# Patient Record
Sex: FEMALE | Race: WHITE | NOT HISPANIC OR LATINO | Employment: FULL TIME | URBAN - METROPOLITAN AREA
[De-identification: names, ages, dates, MRNs, and addresses within clinical notes are randomized per-mention and may not be internally consistent; named-entity substitution may affect disease eponyms.]

---

## 2022-01-27 ENCOUNTER — APPOINTMENT (OUTPATIENT)
Dept: GENERAL RADIOLOGY | Facility: CLINIC | Age: 37
DRG: 871 | End: 2022-01-27
Attending: EMERGENCY MEDICINE
Payer: COMMERCIAL

## 2022-01-27 ENCOUNTER — HOSPITAL ENCOUNTER (INPATIENT)
Facility: CLINIC | Age: 37
LOS: 2 days | Discharge: HOME | DRG: 871 | End: 2022-01-29
Attending: EMERGENCY MEDICINE | Admitting: OBSTETRICS & GYNECOLOGY
Payer: COMMERCIAL

## 2022-01-27 DIAGNOSIS — D72.829 LEUKOCYTOSIS, UNSPECIFIED TYPE: ICD-10-CM

## 2022-01-27 DIAGNOSIS — N76.2 VULVAR CELLULITIS: ICD-10-CM

## 2022-01-27 DIAGNOSIS — U07.1 INFECTION DUE TO 2019 NOVEL CORONAVIRUS: ICD-10-CM

## 2022-01-27 LAB
ALBUMIN SERPL-MCNC: 3.4 G/DL (ref 3.4–5)
ALBUMIN UR-MCNC: 50 MG/DL
ALP SERPL-CCNC: 89 U/L (ref 40–150)
ALT SERPL W P-5'-P-CCNC: 21 U/L (ref 0–50)
ANION GAP SERPL CALCULATED.3IONS-SCNC: 12 MMOL/L (ref 3–14)
APPEARANCE UR: CLEAR
AST SERPL W P-5'-P-CCNC: 12 U/L (ref 0–45)
BACTERIA #/AREA URNS HPF: ABNORMAL /HPF
BASOPHILS # BLD AUTO: 0.1 10E3/UL (ref 0–0.2)
BASOPHILS NFR BLD AUTO: 0 %
BILIRUB DIRECT SERPL-MCNC: 0.3 MG/DL (ref 0–0.2)
BILIRUB SERPL-MCNC: 1.2 MG/DL (ref 0.2–1.3)
BILIRUB UR QL STRIP: NEGATIVE
BUN SERPL-MCNC: 10 MG/DL (ref 7–30)
CALCIUM SERPL-MCNC: 9 MG/DL (ref 8.5–10.1)
CHLORIDE BLD-SCNC: 102 MMOL/L (ref 94–109)
CO2 SERPL-SCNC: 23 MMOL/L (ref 20–32)
COLOR UR AUTO: YELLOW
CREAT SERPL-MCNC: 0.71 MG/DL (ref 0.52–1.04)
CRP SERPL-MCNC: 108 MG/L (ref 0–8)
D DIMER PPP FEU-MCNC: 0.89 UG/ML FEU (ref 0–0.5)
EOSINOPHIL # BLD AUTO: 0 10E3/UL (ref 0–0.7)
EOSINOPHIL NFR BLD AUTO: 0 %
ERYTHROCYTE [DISTWIDTH] IN BLOOD BY AUTOMATED COUNT: 12.3 % (ref 10–15)
GFR SERPL CREATININE-BSD FRML MDRD: >90 ML/MIN/1.73M2
GLUCOSE BLD-MCNC: 106 MG/DL (ref 70–99)
GLUCOSE UR STRIP-MCNC: NEGATIVE MG/DL
HCG UR QL: NEGATIVE
HCT VFR BLD AUTO: 39.1 % (ref 35–47)
HGB BLD-MCNC: 13.1 G/DL (ref 11.7–15.7)
HGB UR QL STRIP: ABNORMAL
HOLD SPECIMEN: NORMAL
IMM GRANULOCYTES # BLD: 0.3 10E3/UL
IMM GRANULOCYTES NFR BLD: 1 %
KETONES UR STRIP-MCNC: >150 MG/DL
LACTATE SERPL-SCNC: 1 MMOL/L (ref 0.7–2)
LEUKOCYTE ESTERASE UR QL STRIP: NEGATIVE
LYMPHOCYTES # BLD AUTO: 2.5 10E3/UL (ref 0.8–5.3)
LYMPHOCYTES NFR BLD AUTO: 8 %
MCH RBC QN AUTO: 29.8 PG (ref 26.5–33)
MCHC RBC AUTO-ENTMCNC: 33.5 G/DL (ref 31.5–36.5)
MCV RBC AUTO: 89 FL (ref 78–100)
MONOCYTES # BLD AUTO: 1.8 10E3/UL (ref 0–1.3)
MONOCYTES NFR BLD AUTO: 6 %
MUCOUS THREADS #/AREA URNS LPF: PRESENT /LPF
NEUTROPHILS # BLD AUTO: 25.7 10E3/UL (ref 1.6–8.3)
NEUTROPHILS NFR BLD AUTO: 85 %
NITRATE UR QL: NEGATIVE
NRBC # BLD AUTO: 0 10E3/UL
NRBC BLD AUTO-RTO: 0 /100
PH UR STRIP: 5.5 [PH] (ref 5–7)
PLATELET # BLD AUTO: 264 10E3/UL (ref 150–450)
POTASSIUM BLD-SCNC: 3.1 MMOL/L (ref 3.4–5.3)
PROCALCITONIN SERPL-MCNC: 0.25 NG/ML
PROT SERPL-MCNC: 6.2 G/DL (ref 6.8–8.8)
RBC # BLD AUTO: 4.39 10E6/UL (ref 3.8–5.2)
RBC URINE: 6 /HPF
SARS-COV-2 RNA RESP QL NAA+PROBE: POSITIVE
SODIUM SERPL-SCNC: 137 MMOL/L (ref 133–144)
SP GR UR STRIP: 1.02 (ref 1–1.03)
SQUAMOUS EPITHELIAL: 2 /HPF
UROBILINOGEN UR STRIP-MCNC: NORMAL MG/DL
WBC # BLD AUTO: 30.4 10E3/UL (ref 4–11)
WBC URINE: 3 /HPF

## 2022-01-27 PROCEDURE — 87075 CULTR BACTERIA EXCEPT BLOOD: CPT | Performed by: EMERGENCY MEDICINE

## 2022-01-27 PROCEDURE — 84145 PROCALCITONIN (PCT): CPT | Performed by: PHYSICIAN ASSISTANT

## 2022-01-27 PROCEDURE — 87176 TISSUE HOMOGENIZATION CULTR: CPT | Performed by: EMERGENCY MEDICINE

## 2022-01-27 PROCEDURE — 85379 FIBRIN DEGRADATION QUANT: CPT | Performed by: PHYSICIAN ASSISTANT

## 2022-01-27 PROCEDURE — 99207 PR CDG-HISTORY COMPONENT: MEETS DETAILED - DOWN CODED LACK OF PFSH: CPT | Performed by: INTERNAL MEDICINE

## 2022-01-27 PROCEDURE — 87635 SARS-COV-2 COVID-19 AMP PRB: CPT | Performed by: STUDENT IN AN ORGANIZED HEALTH CARE EDUCATION/TRAINING PROGRAM

## 2022-01-27 PROCEDURE — 85025 COMPLETE CBC W/AUTO DIFF WBC: CPT | Performed by: EMERGENCY MEDICINE

## 2022-01-27 PROCEDURE — 71046 X-RAY EXAM CHEST 2 VIEWS: CPT

## 2022-01-27 PROCEDURE — 258N000003 HC RX IP 258 OP 636: Performed by: EMERGENCY MEDICINE

## 2022-01-27 PROCEDURE — 258N000003 HC RX IP 258 OP 636: Performed by: INTERNAL MEDICINE

## 2022-01-27 PROCEDURE — 81003 URINALYSIS AUTO W/O SCOPE: CPT | Performed by: EMERGENCY MEDICINE

## 2022-01-27 PROCEDURE — 250N000011 HC RX IP 250 OP 636: Performed by: INTERNAL MEDICINE

## 2022-01-27 PROCEDURE — C9803 HOPD COVID-19 SPEC COLLECT: HCPCS

## 2022-01-27 PROCEDURE — 36415 COLL VENOUS BLD VENIPUNCTURE: CPT | Performed by: EMERGENCY MEDICINE

## 2022-01-27 PROCEDURE — 86140 C-REACTIVE PROTEIN: CPT | Performed by: PHYSICIAN ASSISTANT

## 2022-01-27 PROCEDURE — 99221 1ST HOSP IP/OBS SF/LOW 40: CPT | Mod: AI | Performed by: INTERNAL MEDICINE

## 2022-01-27 PROCEDURE — 99285 EMERGENCY DEPT VISIT HI MDM: CPT | Mod: 25

## 2022-01-27 PROCEDURE — 81025 URINE PREGNANCY TEST: CPT | Performed by: EMERGENCY MEDICINE

## 2022-01-27 PROCEDURE — 87040 BLOOD CULTURE FOR BACTERIA: CPT | Performed by: EMERGENCY MEDICINE

## 2022-01-27 PROCEDURE — 96361 HYDRATE IV INFUSION ADD-ON: CPT

## 2022-01-27 PROCEDURE — 99203 OFFICE O/P NEW LOW 30 MIN: CPT | Mod: GC | Performed by: OBSTETRICS & GYNECOLOGY

## 2022-01-27 PROCEDURE — 250N000013 HC RX MED GY IP 250 OP 250 PS 637: Performed by: EMERGENCY MEDICINE

## 2022-01-27 PROCEDURE — 99285 EMERGENCY DEPT VISIT HI MDM: CPT | Performed by: EMERGENCY MEDICINE

## 2022-01-27 PROCEDURE — 250N000011 HC RX IP 250 OP 636: Performed by: EMERGENCY MEDICINE

## 2022-01-27 PROCEDURE — 87077 CULTURE AEROBIC IDENTIFY: CPT | Performed by: EMERGENCY MEDICINE

## 2022-01-27 PROCEDURE — 82310 ASSAY OF CALCIUM: CPT | Performed by: EMERGENCY MEDICINE

## 2022-01-27 PROCEDURE — 120N000002 HC R&B MED SURG/OB UMMC

## 2022-01-27 PROCEDURE — 36415 COLL VENOUS BLD VENIPUNCTURE: CPT | Performed by: PHYSICIAN ASSISTANT

## 2022-01-27 PROCEDURE — 82248 BILIRUBIN DIRECT: CPT | Performed by: PHYSICIAN ASSISTANT

## 2022-01-27 PROCEDURE — 96365 THER/PROPH/DIAG IV INF INIT: CPT

## 2022-01-27 PROCEDURE — 83605 ASSAY OF LACTIC ACID: CPT | Performed by: EMERGENCY MEDICINE

## 2022-01-27 PROCEDURE — 96367 TX/PROPH/DG ADDL SEQ IV INF: CPT

## 2022-01-27 RX ORDER — PIPERACILLIN SODIUM, TAZOBACTAM SODIUM 3; .375 G/15ML; G/15ML
3.38 INJECTION, POWDER, LYOPHILIZED, FOR SOLUTION INTRAVENOUS ONCE
Status: COMPLETED | OUTPATIENT
Start: 2022-01-27 | End: 2022-01-27

## 2022-01-27 RX ORDER — POTASSIUM CHLORIDE 750 MG/1
40 TABLET, EXTENDED RELEASE ORAL ONCE
Status: COMPLETED | OUTPATIENT
Start: 2022-01-27 | End: 2022-01-27

## 2022-01-27 RX ORDER — CEFAZOLIN SODIUM 1 G/50ML
2000 SOLUTION INTRAVENOUS ONCE
Status: COMPLETED | OUTPATIENT
Start: 2022-01-27 | End: 2022-01-27

## 2022-01-27 RX ORDER — PIPERACILLIN SODIUM, TAZOBACTAM SODIUM 3; .375 G/15ML; G/15ML
3.38 INJECTION, POWDER, LYOPHILIZED, FOR SOLUTION INTRAVENOUS EVERY 6 HOURS
Status: DISCONTINUED | OUTPATIENT
Start: 2022-01-27 | End: 2022-01-29 | Stop reason: HOSPADM

## 2022-01-27 RX ORDER — LIDOCAINE 40 MG/G
CREAM TOPICAL
Status: DISCONTINUED | OUTPATIENT
Start: 2022-01-27 | End: 2022-01-29 | Stop reason: HOSPADM

## 2022-01-27 RX ORDER — SODIUM CHLORIDE 9 MG/ML
INJECTION, SOLUTION INTRAVENOUS
Status: DISPENSED
Start: 2022-01-27 | End: 2022-01-28

## 2022-01-27 RX ADMIN — PIPERACILLIN AND TAZOBACTAM 3.38 G: 3; .375 INJECTION, POWDER, LYOPHILIZED, FOR SOLUTION INTRAVENOUS at 15:14

## 2022-01-27 RX ADMIN — PIPERACILLIN AND TAZOBACTAM 3.38 G: 3; .375 INJECTION, POWDER, LYOPHILIZED, FOR SOLUTION INTRAVENOUS at 21:08

## 2022-01-27 RX ADMIN — VANCOMYCIN HYDROCHLORIDE 1500 MG: 1 INJECTION, POWDER, LYOPHILIZED, FOR SOLUTION INTRAVENOUS at 23:44

## 2022-01-27 RX ADMIN — VANCOMYCIN HYDROCHLORIDE 2000 MG: 1 INJECTION, POWDER, LYOPHILIZED, FOR SOLUTION INTRAVENOUS at 09:31

## 2022-01-27 RX ADMIN — SODIUM CHLORIDE 1000 ML: 9 INJECTION, SOLUTION INTRAVENOUS at 05:34

## 2022-01-27 RX ADMIN — POTASSIUM CHLORIDE 40 MEQ: 750 TABLET, EXTENDED RELEASE ORAL at 16:01

## 2022-01-27 RX ADMIN — PIPERACILLIN AND TAZOBACTAM 3.38 G: 3; .375 INJECTION, POWDER, LYOPHILIZED, FOR SOLUTION INTRAVENOUS at 08:41

## 2022-01-27 ASSESSMENT — ACTIVITIES OF DAILY LIVING (ADL)
DIFFICULTY_COMMUNICATING: NO
ADLS_ACUITY_SCORE: 8
DIFFICULTY_EATING/SWALLOWING: NO
HEARING_DIFFICULTY_OR_DEAF: NO
FALL_HISTORY_WITHIN_LAST_SIX_MONTHS: NO
ADLS_ACUITY_SCORE: 8
ADLS_ACUITY_SCORE: 8
WALKING_OR_CLIMBING_STAIRS_DIFFICULTY: NO
DOING_ERRANDS_INDEPENDENTLY_DIFFICULTY: NO
ADLS_ACUITY_SCORE: 8
WEAR_GLASSES_OR_BLIND: YES
ADLS_ACUITY_SCORE: 4
DRESSING/BATHING_DIFFICULTY: NO
PATIENT_/_FAMILY_COMMUNICATION_STYLE: SPOKEN LANGUAGE (ENGLISH OR BILINGUAL)
ADLS_ACUITY_SCORE: 8
TOILETING_ISSUES: NO
ADLS_ACUITY_SCORE: 8
ADLS_ACUITY_SCORE: 4
ADLS_ACUITY_SCORE: 8
ADLS_ACUITY_SCORE: 8
CONCENTRATING,_REMEMBERING_OR_MAKING_DECISIONS_DIFFICULTY: NO

## 2022-01-27 ASSESSMENT — MIFFLIN-ST. JEOR: SCORE: 2104.27

## 2022-01-27 NOTE — PHARMACY-VANCOMYCIN DOSING SERVICE
"Pharmacy Vancomycin Initial Note  Date of Service 2022  Patient's  1985  36 year old, female    Indication: fever, vulvar cellulitis     Current estimated CrCl = Estimated Creatinine Clearance: 159.6 mL/min (based on SCr of 0.71 mg/dL).    Creatinine for last 3 days  2022:  2:21 AM Creatinine 0.71 mg/dL    Recent Vancomycin Level(s) for last 3 days  No results found for requested labs within last 72 hours.      Vancomycin IV Administrations (past 72 hours)      No vancomycin orders with administrations in past 72 hours.                Nephrotoxins and other renal medications (From now, onward)    Start     Dose/Rate Route Frequency Ordered Stop    22 0855  vancomycin (VANCOCIN) 2,000 mg in sodium chloride 0.9 % 500 mL intermittent infusion         2,000 mg  over 2 Hours Intravenous ONCE 22 0850      22 0750  piperacillin-tazobactam (ZOSYN) 3.375 g vial to attach to  mL bag        Note to Pharmacy: For [facility], [facility] and [facility]: For Zosyn-naive patients, use the \"Zosyn initial dose + extended infusion\" order panel.    3.375 g  over 30 Minutes Intravenous ONCE 22 0749            Contrast Orders - past 72 hours (72h ago, onward)            None        Loading dose: 2000 mg at 09:00 2022.  Regimen: 1500 mg IV every 12 hours.  Exposure target: AUC24 (range)400-600 mg/L.hr   AUC24,ss: 538 mg/L.hr  Probability of AUC24 > 400: 71 %  Ctrough,ss: 13.6 mg/L  Probability of Ctrough,ss > 20: 33 %  Probability of nephrotoxicity (Lodise BRISA ): 9 %          Plan:  1. Start vancomycin  2000 mg (14.6 mg/kg) IV x1 in ED. Will f/u if vancomycin/zosyn are to be continued. If continued, recommend vancomycin 1500 mg (10.9 mg/kg) IV q12h.   2. Vancomycin monitoring method: AUC  3. Vancomycin therapeutic monitoring goal: 400-600 mg*h/L  4. Pharmacy will check vancomycin levels as appropriate in 1-3 Days.    5. Serum creatinine levels will be ordered every 48 hours.      Thad Logan, " RPH

## 2022-01-27 NOTE — ED TRIAGE NOTES
"Vaginal redness with swelling started Sunday. Progressively getting worse with fever and chills starting today.  Fever 102F, took \"two Aleve\" does not know dosage.  Denies itchiness, abdominal pain.  "

## 2022-01-27 NOTE — CONSULTS
"Gynecology Consult Note    Consulting Provider: Kandice  Consulting Service: ED  Reason for Consult: Labial abscess    HPI:   Renata Tobar is a 36 year old with no PMH who presented to the ED with fever and labial lesion. States that 4 days ago she started to notice swelling on her left labia. The swelling progressively got worse and she felt pressure and some pain. Last night she had a fever to 102 F at home and came to the ED. In the ED the lesion on her labia started to spontaneously drain. She denies chills, HA, appetite changes, CP, SOB, N/V, abdominal pain, vaginal discharge, vaginal itching/burning, recent intercourse, dysuria, diarrhea, constipation. She states there is no chance she could be pregnant. She states she has no medical problems, no history of surgery, and takes no medications. She has not seen a doctor for at least 10 to 12 years.    ROS:   Negative except as per HPI    GYN History:   Has never had a pap smear    PMH:   Denies    PSHx:   Denies    Medications:   No current outpatient medications    Allergies:    No Known Allergies    Social History:   Social History     Tobacco Use     Smoking status: Not on file     Smokeless tobacco: Not on file   Substance Use Topics     Alcohol use: Not on file     Drug use: Not on file       Physical Exam:   Patient Vitals for the past 24 hrs:   BP Temp Temp src Pulse Resp SpO2 Height Weight   01/27/22 0536 135/85 99.3  F (37.4  C) Oral 112 18 96 % -- --   01/27/22 0530 135/85 -- -- -- -- -- -- --   01/27/22 0240 -- -- -- -- -- -- -- 137.1 kg (302 lb 3.2 oz)   01/27/22 0035 128/82 98.2  F (36.8  C) Oral (!) 139 17 95 % 1.727 m (5' 8\") --     Gen:  Resting comfortably, NAD  CV:  Well perfused  Pulm:  NWOB  Abd:  Soft, non-tender, non-distended  Gyn: Nickel sized lesion on left lower labia. Lesion leaking brown serosanguinous fluid that looks like old blood. Lesion probed with q-tip and tracks approximately 4-5 cm posterior and 1-2 cm in other directions. " Lesion flushed with sterile water. Labia is warm, erythematous, and somewhat indurated but without fluctuance or crepitus.    Labs:  Results for orders placed or performed during the hospital encounter of 01/27/22   Chest XR,  PA & LAT     Status: None    Narrative    EXAM: CHEST 2 VIEWS  LOCATION: [facility]  DATE/TIME: 1/27/2022 5:15 AM    INDICATION: Fever. Tachycardia.  COMPARISON: None.    FINDINGS: The lungs are clear. Normal size cardiac silhouette.      Impression    IMPRESSION: No evidence of active cardiopulmonary disease.                UA with Microscopic reflex to Culture     Status: Abnormal    Specimen: Urine, Midstream   Result Value Ref Range    Color Urine Yellow Colorless, Straw, Light Yellow, Yellow    Appearance Urine Clear Clear    Glucose Urine Negative Negative mg/dL    Bilirubin Urine Negative Negative    Ketones Urine >150 (A) Negative mg/dL    Specific Gravity Urine 1.019 1.003 - 1.035    Blood Urine Large (A) Negative    pH Urine 5.5 5.0 - 7.0    Protein Albumin Urine 50  (A) Negative mg/dL    Urobilinogen Urine Normal Normal, 2.0 mg/dL    Nitrite Urine Negative Negative    Leukocyte Esterase Urine Negative Negative    Bacteria Urine Few (A) None Seen /HPF    Mucus Urine Present (A) None Seen /LPF    RBC Urine 6 (H) <=2 /HPF    WBC Urine 3 <=5 /HPF    Squamous Epithelials Urine 2 (H) <=1 /HPF    Narrative    Urine Culture not indicated   HCG qualitative urine     Status: Normal   Result Value Ref Range    hCG Urine Qualitative Negative Negative   Extra Red Top Tube     Status: None   Result Value Ref Range    Hold Specimen JIC    Extra Green Top (Lithium Heparin) Tube     Status: None   Result Value Ref Range    Hold Specimen JIC    Extra Purple Top Tube     Status: None   Result Value Ref Range    Hold Specimen JIC    Basic metabolic panel     Status: Abnormal   Result Value Ref Range    Sodium 137 133 - 144 mmol/L    Potassium 3.1 (L) 3.4 -  5.3 mmol/L    Chloride 102 94 - 109 mmol/L    Carbon Dioxide (CO2) 23 20 - 32 mmol/L    Anion Gap 12 3 - 14 mmol/L    Urea Nitrogen 10 7 - 30 mg/dL    Creatinine 0.71 0.52 - 1.04 mg/dL    Calcium 9.0 8.5 - 10.1 mg/dL    Glucose 106 (H) 70 - 99 mg/dL    GFR Estimate >90 >60 mL/min/1.73m2   Lactic acid whole blood     Status: Normal   Result Value Ref Range    Lactic Acid 1.0 0.7 - 2.0 mmol/L   Asymptomatic COVID-19 Virus (Coronavirus) by PCR Nasopharyngeal     Status: Abnormal    Specimen: Nasopharyngeal; Swab   Result Value Ref Range    SARS CoV2 PCR Positive (A) Negative    Narrative    Testing was performed using the john  SARS-CoV-2 & Influenza A/B Assay on the john  Deb  System.  This test should be ordered for the detection of SARS-COV-2 in individuals who meet SARS-CoV-2 clinical and/or epidemiological criteria. Test performance is unknown in asymptomatic patients.  This test is for in vitro diagnostic use under the FDA EUA for laboratories certified under CLIA to perform moderate and/or high complexity testing. This test has not been FDA cleared or approved.  A negative test does not rule out the presence of PCR inhibitors in the specimen or target RNA in concentration below the limit of detection for the assay. The possibility of a false negative should be considered if the patient's recent exposure or clinical presentation suggests COVID-19.  [facility] are certified under the Clinical Laboratory Improvement Amendments of 1988 (CLIA-88) as qualified to perform moderate and/or high complexity laboratory testing.   CBC with platelets and differential     Status: Abnormal   Result Value Ref Range    WBC Count 30.4 (H) 4.0 - 11.0 10e3/uL    RBC Count 4.39 3.80 - 5.20 10e6/uL    Hemoglobin 13.1 11.7 - 15.7 g/dL    Hematocrit 39.1 35.0 - 47.0 %    MCV 89 78 - 100 fL    MCH 29.8 26.5 - 33.0 pg    MCHC 33.5 31.5 - 36.5 g/dL    RDW 12.3 10.0 - 15.0 %    Platelet Count 264 150 - 450 10e3/uL     % Neutrophils 85 %    % Lymphocytes 8 %    % Monocytes 6 %    % Eosinophils 0 %    % Basophils 0 %    % Immature Granulocytes 1 %    NRBCs per 100 WBC 0 <1 /100    Absolute Neutrophils 25.7 (H) 1.6 - 8.3 10e3/uL    Absolute Lymphocytes 2.5 0.8 - 5.3 10e3/uL    Absolute Monocytes 1.8 (H) 0.0 - 1.3 10e3/uL    Absolute Eosinophils 0.0 0.0 - 0.7 10e3/uL    Absolute Basophils 0.1 0.0 - 0.2 10e3/uL    Absolute Immature Granulocytes 0.3 <=0.4 10e3/uL    Absolute NRBCs 0.0 10e3/uL   [place] Draw     Status: None    Narrative    The following orders were created for panel order [place] Draw.  Procedure                               Abnormality         Status                     ---------                               -----------         ------                     Extra Red Top Tube[[id number]]                               Final result               Extra Green Top (Lithium...[[id number]]                      Final result               Extra Purple Top Tube[[id number]]                            Final result                 Please view results for these tests on the individual orders.   CBC with platelets differential     Status: Abnormal    Narrative    The following orders were created for panel order CBC with platelets differential.  Procedure                               Abnormality         Status                     ---------                               -----------         ------                     CBC with platelets and d...[[id number]]  Abnormal            Final result                 Please view results for these tests on the individual orders.      Assessment/Recommendations: :   Renata Tobar is a 36 year old who has not been in care for 10-12 who presents to the ED with fever, tachycardia, and small labial abscess. In the ED she tested positive for COVID, had WBC to 30.4, and negative lactate. The abscess is small and benign appearing. While she certainly has a labial infection it is unlikely that this would lead  to a fever of 102 F and WBC of 30.4. Suspect that these systemic findings are due to another infectious cause such as COVID.    #Labial abscess  - For labial abscess recommend Bactrim DS 2 tabs BID x 5D  - Follow labial swabs, UA, blood cultures  - Follow-up in clinic in 1 week    #Fever  #Leukocytosis  #COVID  - Workup and continued care per primary team    Thank you for this consult. Gyn team will sign off this time. Please contact us with concerns or questions.       Patient seen and discussed with Zachary.    Luke Adkins MD MPH  OB/Gyn PGY-3  01/27/22 9:24 AM    GYN Pager  Monday to Friday 6:30 AM to 6 PM: [phone]   Monday to Friday 6 PM to 6:30 AM and Weekends: [phone]

## 2022-01-27 NOTE — ED PROVIDER NOTES
ED Provider Note  [facility]      History     Chief Complaint   Patient presents with     Groin Swelling     Vaginal swelling, redness, painful     HPI  Renata Tobar is a 36 year old female who presents with a painful lump to the left side of her genitals.  She states that on Sunday, 4 days ago, she first noticed a little bit of swelling and some mild irritation to the left side of her genital region.  She states that it progressively came more swollen and more painful over the last few days, pretty significant today.  She states today she spiked a fever over 102.  She denies any other symptoms such as stuffy nose, sore throat, cough, shortness of breath, abdominal pain, nausea, vomiting, diarrhea.  She states she just got her menses today but prior to that had not had any abnormal vaginal discharge.  She denies any previous history of sexually transmitted infections or any recent change in sexual partners.  No history of similar.    She additionally states that while lying in the bed waiting to be seen the mass seemed to break open, and she feels it is now bleeding.    Past Medical History  No past medical history on file.  No past surgical history on file.  No current outpatient medications on file.    No Known Allergies  Family History  No family history on file.  Social History   Social History     Tobacco Use     Smoking status: Not on file     Smokeless tobacco: Not on file   Substance Use Topics     Alcohol use: Not on file     Drug use: Not on file      Past medical history, past surgical history, medications, allergies, family history, and social history were reviewed with the patient. No additional pertinent items.       Review of Systems  A complete review of systems was performed with pertinent positives and negatives noted in the HPI, and all other systems negative.    Physical Exam   BP: 128/82  Pulse: (!) 139  Temp: 98.2  F (36.8  C)  Resp: 17  Height: 172.7 cm (5'  "8\")  Weight: 137.1 kg (302 lb 3.2 oz)  SpO2: 95 %  Physical Exam  Constitutional:       General: She is not in acute distress.     Appearance: She is not diaphoretic.   HENT:      Head: Atraumatic.   Eyes:      General: No scleral icterus.  Cardiovascular:      Heart sounds: Normal heart sounds.   Pulmonary:      Effort: No respiratory distress.      Breath sounds: Normal breath sounds.   Abdominal:      Palpations: Abdomen is soft.      Tenderness: There is no abdominal tenderness.   Genitourinary:      Musculoskeletal:         General: No tenderness.   Skin:     General: Skin is warm.      Findings: No rash.         ED Course      Procedures                     Results for orders placed or performed during the hospital encounter of 01/27/22   Chest XR,  PA & LAT     Status: None    Narrative    EXAM: CHEST 2 VIEWS  LOCATION: [facility]  DATE/TIME: 1/27/2022 5:15 AM    INDICATION: Fever. Tachycardia.  COMPARISON: None.    FINDINGS: The lungs are clear. Normal size cardiac silhouette.      Impression    IMPRESSION: No evidence of active cardiopulmonary disease.                UA with Microscopic reflex to Culture     Status: Abnormal    Specimen: Urine, Midstream   Result Value Ref Range    Color Urine Yellow Colorless, Straw, Light Yellow, Yellow    Appearance Urine Clear Clear    Glucose Urine Negative Negative mg/dL    Bilirubin Urine Negative Negative    Ketones Urine >150 (A) Negative mg/dL    Specific Gravity Urine 1.019 1.003 - 1.035    Blood Urine Large (A) Negative    pH Urine 5.5 5.0 - 7.0    Protein Albumin Urine 50  (A) Negative mg/dL    Urobilinogen Urine Normal Normal, 2.0 mg/dL    Nitrite Urine Negative Negative    Leukocyte Esterase Urine Negative Negative    Bacteria Urine Few (A) None Seen /HPF    Mucus Urine Present (A) None Seen /LPF    RBC Urine 6 (H) <=2 /HPF    WBC Urine 3 <=5 /HPF    Squamous Epithelials Urine 2 (H) <=1 /HPF    Narrative    Urine " Culture not indicated   HCG qualitative urine     Status: Normal   Result Value Ref Range    hCG Urine Qualitative Negative Negative   Extra Red Top Tube     Status: None   Result Value Ref Range    Hold Specimen JIC    Extra Green Top (Lithium Heparin) Tube     Status: None   Result Value Ref Range    Hold Specimen JIC    Extra Purple Top Tube     Status: None   Result Value Ref Range    Hold Specimen JIC    Basic metabolic panel     Status: Abnormal   Result Value Ref Range    Sodium 137 133 - 144 mmol/L    Potassium 3.1 (L) 3.4 - 5.3 mmol/L    Chloride 102 94 - 109 mmol/L    Carbon Dioxide (CO2) 23 20 - 32 mmol/L    Anion Gap 12 3 - 14 mmol/L    Urea Nitrogen 10 7 - 30 mg/dL    Creatinine 0.71 0.52 - 1.04 mg/dL    Calcium 9.0 8.5 - 10.1 mg/dL    Glucose 106 (H) 70 - 99 mg/dL    GFR Estimate >90 >60 mL/min/1.73m2   Lactic acid whole blood     Status: Normal   Result Value Ref Range    Lactic Acid 1.0 0.7 - 2.0 mmol/L   Asymptomatic COVID-19 Virus (Coronavirus) by PCR Nasopharyngeal     Status: Abnormal    Specimen: Nasopharyngeal; Swab   Result Value Ref Range    SARS CoV2 PCR Positive (A) Negative    Narrative    Testing was performed using the john  SARS-CoV-2 & Influenza A/B Assay on the john  Deb  System.  This test should be ordered for the detection of SARS-COV-2 in individuals who meet SARS-CoV-2 clinical and/or epidemiological criteria. Test performance is unknown in asymptomatic patients.  This test is for in vitro diagnostic use under the FDA EUA for laboratories certified under CLIA to perform moderate and/or high complexity testing. This test has not been FDA cleared or approved.  A negative test does not rule out the presence of PCR inhibitors in the specimen or target RNA in concentration below the limit of detection for the assay. The possibility of a false negative should be considered if the patient's recent exposure or clinical presentation suggests COVID-19.  [facility]  are certified under the Clinical Laboratory Improvement Amendments of 1988 (CLIA-88) as qualified to perform moderate and/or high complexity laboratory testing.   CBC with platelets and differential     Status: Abnormal   Result Value Ref Range    WBC Count 30.4 (H) 4.0 - 11.0 10e3/uL    RBC Count 4.39 3.80 - 5.20 10e6/uL    Hemoglobin 13.1 11.7 - 15.7 g/dL    Hematocrit 39.1 35.0 - 47.0 %    MCV 89 78 - 100 fL    MCH 29.8 26.5 - 33.0 pg    MCHC 33.5 31.5 - 36.5 g/dL    RDW 12.3 10.0 - 15.0 %    Platelet Count 264 150 - 450 10e3/uL    % Neutrophils 85 %    % Lymphocytes 8 %    % Monocytes 6 %    % Eosinophils 0 %    % Basophils 0 %    % Immature Granulocytes 1 %    NRBCs per 100 WBC 0 <1 /100    Absolute Neutrophils 25.7 (H) 1.6 - 8.3 10e3/uL    Absolute Lymphocytes 2.5 0.8 - 5.3 10e3/uL    Absolute Monocytes 1.8 (H) 0.0 - 1.3 10e3/uL    Absolute Eosinophils 0.0 0.0 - 0.7 10e3/uL    Absolute Basophils 0.1 0.0 - 0.2 10e3/uL    Absolute Immature Granulocytes 0.3 <=0.4 10e3/uL    Absolute NRBCs 0.0 10e3/uL   [place] Draw     Status: None    Narrative    The following orders were created for panel order [place] Draw.  Procedure                               Abnormality         Status                     ---------                               -----------         ------                     Extra Red Top Tube[[id number]]                               Final result               Extra Green Top (Lithium...[[id number]]                      Final result               Extra Purple Top Tube[[id number]]                            Final result                 Please view results for these tests on the individual orders.   CBC with platelets differential     Status: Abnormal    Narrative    The following orders were created for panel order CBC with platelets differential.  Procedure                               Abnormality         Status                     ---------                               -----------         ------                      CBC with platelets and d...[[id number]]  Abnormal            Final result                 Please view results for these tests on the individual orders.     Medications   piperacillin-tazobactam (ZOSYN) 3.375 g vial to attach to  mL bag (has no administration in time range)   0.9% sodium chloride BOLUS (1,000 mLs Intravenous New Bag 1/27/22 0534)        Assessments & Plan (with Medical Decision Making)   The patient was initially quite tachycardic.  Heart rate did come down some while she was here.  I did ask gynecology to evaluate the patient, and they did probe the wound, do not feel that it tracks deeply.  They felt that it would be unusual for this type of a cellulitis/infection to cause tachycardia and high fever, recommended evaluated for other possible causes.  The patient has a benign abdominal exam and no abdominal complaints.  I did check a chest x-ray which was unremarkable, UA which is unremarkable.  Covid did come back positive.  I did speak with her again, she continues to deny any Covid symptoms, and her only symptom really is the fever as well as the vulvar cellulitis.  White blood cell count did come back very high at 30.  Given this very high white blood cell count as well as her tachycardia, I am concerned that it is hard to explain that a way to Covid.  I do think she warrants being brought in for observation for a day and some IV antibiotics.  Zosyn and vancomycin ordered at this time.  Is unclear whether the vulvar cellulitis is driving this versus Covid versus a yet unidentified infection.  Regardless I do think she should be monitored to make sure she is improving prior to discharge.  She was signed out at shift change pending admission.    Dictation Disclaimer: Some of this Note has been completed with voice-recognition dictation software. Although errors are generally corrected real-time, there is the potential for a rare error to be present in the completed chart.      I have  reviewed the nursing notes. I have reviewed the findings, diagnosis, plan and need for follow up with the patient.    New Prescriptions    No medications on file       Final diagnoses:   Vulvar cellulitis   Infection due to 2019 novel coronavirus   Leukocytosis, unspecified type       --  Cleo CAMPUZANO [facility] EMERGENCY DEPARTMENT  1/27/2022     Cleo Woodson MD  01/27/22 0811

## 2022-01-27 NOTE — CONSULTS
Consult Date: 01/27/2022    HISTORY OF PRESENT ILLNESS:  The patient is a pleasant 36-year-old female with a history of obesity, but otherwise healthy woman with no other chronic medical problems, admitted to the medical floor after she presented to the ED with a 4-day history of a progressively enlarging labial lesion as well as a fever of 102 degrees Fahrenheit last night.  The patient denies associated symptoms including headache, chest pain, shortness of breath, nausea, vomiting, abdominal pain, abnormal urinary or vaginal symptoms.  Denies lesions elsewhere.  Denies being sexually active.  Gynecology was consulted while the patient was in the ED and upon exam noted that the lesion was spontaneously draining brown serosanguineous fluid, appearing like old blood.  The lesion was probed with a Q-tip and tracked approximately 4-5 cm posteriorly.  No noted labial fluctuance or crepitus. Lab work revealed WBC elevated to 30.4. Due to this and fevers, started on vancomycin and Zosyn. CRP elevated ~ 100. Procal 0.25. Blood and abscess cultures in process.  Incidentally noted to be positive for COVID-19 despite no prior contacts, and other than fever, asymptomatic. Chest xray negative. Other than fever, pt noted to tachycardic with heart rate low 100s, of which she states is due to anxiety of being in hospital. Blood pressures stable and oxygen saturations high 90s on room air.      PAST MEDICAL HISTORY:    1.  Obesity.  2.  Denies history of major medical problems including cardiopulmonary disease, hypertension and diabetes.    PAST SURGICAL HISTORY:  None.    ADMISSION MEDICATIONS:  None.    ALLERGIES:  NO KNOWN DRUG ALLERGIES.    SOCIAL HISTORY:  Single.  No children.  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  Denies smoking cigarettes.  Denies heavy alcohol use.    No family history on file.     REVIEW OF SYSTEMS:  A 10-point review of systems is negative, except as stated above in the history of present  illness.    PHYSICAL EXAMINATION:    GENERAL:  Pleasant lady, in no acute distress.  VITAL SIGNS:  Temperature 101.4 degrees Fahrenheit, pulse 111, blood pressure 134/75, respirations 16, oxygen saturation 97% on room air.  HEENT:  Negative.  NECK:  Supple, no cervical lymphadenopathy or thyromegaly.  LUNGS:  Clear.  CARDIOVASCULAR:  Tachycardic, yet regular.  No murmurs appreciated.  ABDOMEN:  Obese, soft, nontender.  EXTREMITIES:  No edema.  SKIN:  No rash on exposed areas.  PELVIC:  Deferred to gynecology consult.  NEUROLOGIC:  She is awake, alert and oriented x3.  Cranial nerves grossly intact.  Motor strength is symmetric.  She is not tremulous.    LABORATORY DATA:  White blood cell count 30.4, potassium 3.1.  Otherwise, CBC and the rest of the basic metabolic panel unremarkable.  Procalcitonin 0.25.  .  COVID testing positive.  D-dimer 0.89.  Urinalysis with a large amount of blood (patient states she is currently on her menstrual cycle).  Otherwise, unremarkable.  Blood cultures no growth to date.  Labial lesion drainage cultures no growth to date.  Chest x-ray normal.  Lactic acid within normal limits.  Urine pregnancy test negative.    ASSESSMENT:    1.  Labial abscess, now spontaneously draining with cultures in process.  Gynecology consulted and already examined the patient and no other abnormalities noted on exam.  Recommend to start oral Bactrim; however, the patient is currently on vancomycin and Zosyn due to below concerns.   2.  Fever, tachycardia and leukocytosis, likely due to a combination of above labial abscess combined with incidental finding of being COVID positive.  White blood cell count 30.4,  and procalcitonin 0.25.  No other evidence of infection.  Other vital signs WNL including oxygen saturations high 90s on room air.  Urinalysis unremarkable.  Chest x-ray negative.  Blood cultures no growth to date.  Lactic acid within normal limits. Patient otherwise asymptomatic.     3.   COVID positive.  Incidental finding as the patient denies knowing anybody she was in close contact with who had COVID.  D-dimer 0.89.  As above, chest x-ray negative.  The patient is asymptomatic other than ongoing fever, most recently 101.4 degrees Fahrenheit.  Oxygen saturations high 90s on room air.    4.  Hypokalemia, mild.    PLAN:  Continue with empiric Zosyn and vancomycin for the time being into tomorrow am until patient reassessed after repeat labs including CBC and CRP.  Will likely need to consult Infectious Disease tomorrow if inflammatory markers do not significantly improve, or patient continues to spike fevers despite antibiotics.  The patient will be started on COVID precautions as well as DVT prophylaxis with Lovenox 40 mg subcutaneous every 12 hours (increased from daily to b.i.d. due to obesity; discussed with pharmacy).  Potassium replacement protocol will be started.  If inflammatory markers improve tomorrow, we will switch the patient to gynecologic recommended Bactrim 2 tabs b.i.d. for a total of 5 days with regards to her labial abscess.  The patient needs to follow up with the gynecology clinic after discharge in 1 week.  Follow up results of current cultures currently in process including labial swabs and blood cultures.  No further medical intervention indicated at this time.  Staffed with Dr. Hadley Ludwig.    Rickey Wadsworth PA-C  Internal Medicine Hospitalist   [facility]  [phone]         D: 2022   T: 2022   MT: [email or web address]    Name:     MELISSA AGARWAL  MRN:      [id number]        Account:      [id number]   :      1985           Consult Date: 2022     Document: [id number]

## 2022-01-28 LAB
ANION GAP SERPL CALCULATED.3IONS-SCNC: 5 MMOL/L (ref 3–14)
BUN SERPL-MCNC: 7 MG/DL (ref 7–30)
CALCIUM SERPL-MCNC: 8.4 MG/DL (ref 8.5–10.1)
CHLORIDE BLD-SCNC: 110 MMOL/L (ref 94–109)
CO2 SERPL-SCNC: 25 MMOL/L (ref 20–32)
CREAT SERPL-MCNC: 0.74 MG/DL (ref 0.52–1.04)
CRP SERPL-MCNC: 120 MG/L (ref 0–8)
ERYTHROCYTE [DISTWIDTH] IN BLOOD BY AUTOMATED COUNT: 12.6 % (ref 10–15)
GFR SERPL CREATININE-BSD FRML MDRD: >90 ML/MIN/1.73M2
GLUCOSE BLD-MCNC: 100 MG/DL (ref 70–99)
HCT VFR BLD AUTO: 34.4 % (ref 35–47)
HGB BLD-MCNC: 11.6 G/DL (ref 11.7–15.7)
MCH RBC QN AUTO: 29.7 PG (ref 26.5–33)
MCHC RBC AUTO-ENTMCNC: 33.7 G/DL (ref 31.5–36.5)
MCV RBC AUTO: 88 FL (ref 78–100)
PLATELET # BLD AUTO: 243 10E3/UL (ref 150–450)
POTASSIUM BLD-SCNC: 3.3 MMOL/L (ref 3.4–5.3)
POTASSIUM BLD-SCNC: 3.6 MMOL/L (ref 3.4–5.3)
RBC # BLD AUTO: 3.9 10E6/UL (ref 3.8–5.2)
SODIUM SERPL-SCNC: 140 MMOL/L (ref 133–144)
WBC # BLD AUTO: 16.2 10E3/UL (ref 4–11)

## 2022-01-28 PROCEDURE — 84132 ASSAY OF SERUM POTASSIUM: CPT | Performed by: INTERNAL MEDICINE

## 2022-01-28 PROCEDURE — 250N000011 HC RX IP 250 OP 636: Performed by: EMERGENCY MEDICINE

## 2022-01-28 PROCEDURE — 36415 COLL VENOUS BLD VENIPUNCTURE: CPT | Performed by: INTERNAL MEDICINE

## 2022-01-28 PROCEDURE — 120N000002 HC R&B MED SURG/OB UMMC

## 2022-01-28 PROCEDURE — 86140 C-REACTIVE PROTEIN: CPT | Performed by: PHYSICIAN ASSISTANT

## 2022-01-28 PROCEDURE — 250N000013 HC RX MED GY IP 250 OP 250 PS 637: Performed by: INTERNAL MEDICINE

## 2022-01-28 PROCEDURE — 85027 COMPLETE CBC AUTOMATED: CPT | Performed by: PHYSICIAN ASSISTANT

## 2022-01-28 PROCEDURE — 36415 COLL VENOUS BLD VENIPUNCTURE: CPT | Performed by: PHYSICIAN ASSISTANT

## 2022-01-28 PROCEDURE — 250N000011 HC RX IP 250 OP 636: Performed by: PHYSICIAN ASSISTANT

## 2022-01-28 PROCEDURE — 99232 SBSQ HOSP IP/OBS MODERATE 35: CPT | Performed by: INTERNAL MEDICINE

## 2022-01-28 PROCEDURE — 82310 ASSAY OF CALCIUM: CPT | Performed by: PHYSICIAN ASSISTANT

## 2022-01-28 RX ORDER — POTASSIUM CHLORIDE 750 MG/1
40 TABLET, EXTENDED RELEASE ORAL ONCE
Status: COMPLETED | OUTPATIENT
Start: 2022-01-28 | End: 2022-01-28

## 2022-01-28 RX ADMIN — PIPERACILLIN AND TAZOBACTAM 3.38 G: 3; .375 INJECTION, POWDER, LYOPHILIZED, FOR SOLUTION INTRAVENOUS at 03:25

## 2022-01-28 RX ADMIN — ENOXAPARIN SODIUM 40 MG: 40 INJECTION SUBCUTANEOUS at 15:01

## 2022-01-28 RX ADMIN — PIPERACILLIN AND TAZOBACTAM 3.38 G: 3; .375 INJECTION, POWDER, LYOPHILIZED, FOR SOLUTION INTRAVENOUS at 21:11

## 2022-01-28 RX ADMIN — PIPERACILLIN AND TAZOBACTAM 3.38 G: 3; .375 INJECTION, POWDER, LYOPHILIZED, FOR SOLUTION INTRAVENOUS at 09:25

## 2022-01-28 RX ADMIN — POTASSIUM CHLORIDE 40 MEQ: 750 TABLET, EXTENDED RELEASE ORAL at 11:37

## 2022-01-28 RX ADMIN — PIPERACILLIN AND TAZOBACTAM 3.38 G: 3; .375 INJECTION, POWDER, LYOPHILIZED, FOR SOLUTION INTRAVENOUS at 15:01

## 2022-01-28 RX ADMIN — ENOXAPARIN SODIUM 40 MG: 40 INJECTION SUBCUTANEOUS at 03:26

## 2022-01-28 ASSESSMENT — ACTIVITIES OF DAILY LIVING (ADL)
ADLS_ACUITY_SCORE: 4

## 2022-01-28 NOTE — PROGRESS NOTES
Pt feels improved   L labial discomfort improved though area feels more swollen  No dysuria, abd pain, cough, chest pain, SOB    T max 100.6 at 2130  Currently 99.6    Alert, fully oriented  Lungs clear  CV rrr  abd soft  L labia edematous, erythema resolved, scan drainage     Results for MELISSA AGARWAL (MRN [id number]) as of 1/28/2022 10:44   Ref. Range 1/28/2022 08:22   Sodium Latest Ref Range: 133 - 144 mmol/L 140   Potassium Latest Ref Range: 3.4 - 5.3 mmol/L 3.3 (L)   Chloride Latest Ref Range: 94 - 109 mmol/L 110 (H)   Carbon Dioxide Latest Ref Range: 20 - 32 mmol/L 25   Urea Nitrogen Latest Ref Range: 7 - 30 mg/dL 7   Creatinine Latest Ref Range: 0.52 - 1.04 mg/dL 0.74   GFR Estimate Latest Ref Range: >60 mL/min/1.73m2 >90   Calcium Latest Ref Range: 8.5 - 10.1 mg/dL 8.4 (L)   Anion Gap Latest Ref Range: 3 - 14 mmol/L 5   CRP Inflammation Latest Ref Range: 0.0 - 8.0 mg/L 120.0 (H)   Glucose Latest Ref Range: 70 - 99 mg/dL 100 (H)   WBC Latest Ref Range: 4.0 - 11.0 10e3/uL 16.2 (H)   Hemoglobin Latest Ref Range: 11.7 - 15.7 g/dL 11.6 (L)   Hematocrit Latest Ref Range: 35.0 - 47.0 % 34.4 (L)   Platelet Count Latest Ref Range: 150 - 450 10e3/uL 243   RBC Count Latest Ref Range: 3.80 - 5.20 10e6/uL 3.90   MCV Latest Ref Range: 78 - 100 fL 88   MCH Latest Ref Range: 26.5 - 33.0 pg 29.7   MCHC Latest Ref Range: 31.5 - 36.5 g/dL 33.7   RDW Latest Ref Range: 10.0 - 15.0 % 12.6     BC NG    Labial drainage:  Culture Culture in progress       4+ Streptococcus anginosus Abnormal     This organism is susceptible to ampicillin, penicillin, vancomycin and the cephalosporins. If treatment is required and your patient is allergic to penicillin, contact the microbiology lab within 5 days to request susceptibility testing.   1+ Escherichia coli Abnormal            Assessment/plan    Fever, leukocytosis, in setting of small labial abscess, evaluated by Gyn prior to admission, self draining. Culture as above. Suspect this is the  cause for Pt's fever, leukocytosis. Labia less red, but increased edema today.  Overall improved  Plan Continue Zosyn, discontinue Vanco today, recheck CRP and WBC tomorrow, likely change to augmentin tomorrow, if doing well clinically, discharge tomorrow if continued improvement    COVID 19 diagnosed 1/27  Continues to appear asymptomatic  Has received 2 vaccines, most recent 11/29/21  Plan monitor    Hypokalemia  Plan K replacement

## 2022-01-28 NOTE — PLAN OF CARE
"  VS: /57   Pulse 113   Temp 98.5  F (36.9  C) (Oral)   Resp 18   Ht 1.727 m (5' 8\")   Wt 137.1 kg (302 lb 3.2 oz)   LMP 01/27/2022 (Exact Date)   SpO2 97%   BMI 45.95 kg/m     O2: SpO2 > 95% on RA. LS clear and equal bilaterally. Pt is COVID asymptomatic. Denies SOB and chest pain   Output: Voids spontaneously without difficulty.   Last BM: 1/27/22 per pt report   Activity: Independent   Skin: WDL except left mass on labia. Mass has slightly enlarged in size over shift - no drainage.   Pain: Denies. Pt reports minor discomfort    CMS: Intact AOX4   Dressing: PIV CDI   Diet: Regular diet, denies nausea and vomiting   LDA: R PIV SL   Equipment: IV pole, personal belongings   Plan: TBD. Continue with plan of care, call light in pt's reach.   Additional Info: Pt arrived around 0830pm, oriented to room. Pt has been sleeping between cares during shift. COVID precautions maintained.      "

## 2022-01-28 NOTE — PHARMACY-ADMISSION MEDICATION HISTORY
Admission Medication History Completed by Pharmacy    See [facility] Admission Navigator for allergy information, preferred outpatient pharmacy, prior to admission medications and immunization status.     Medication History Sources:     Surescripts Dispense Report    Unable to reach patient for interview     Changes made to PTA medication list (reason):    Per fill history and lack of significant medical history, it is unlikely that patient is taking any prescription medications chronically.     Additional Information:    Unable to confirm if patient taking OTC medications or supplements.     Patient has no fill history within the past year and denies any significant past medical history on admission, so unlikely patient taking prescription medications.     Prior to Admission medications    Not on File       Date completed: 01/28/22    Medication history completed by: Jose Hernandez, PharmD

## 2022-01-28 NOTE — UTILIZATION REVIEW
[facility] Utilization Review  Admission Status; Secondary Review Determination     Admission Date: 1/27/2022  1:56 AM      Under the authority of the Utilization Management Committee, the utilization review process indicated a secondary review on the above patient.  The review outcome is based on review of the medical records, discussions with staff, and applying clinical experience noted on the date of the review.        (X)      Inpatient Status Appropriate - This patient's medical care is consistent with medical management for inpatient care and reasonable inpatient medical practice.          RATIONALE FOR DETERMINATION   37 yo F with h/o obesity admitted with fever 102, tachycardia, leukocytosis with progressively enlarging labial lesion. Started on bactrim, per gynecology team. Patient currently on iv vanco and zosyn, blood cultures negative to date.  Patient incidentally found to be Covid positive, also has hypokalemia, which was replaced, currently low-grade fevers up to 99.9 today, with ongoing labial swelling and discharge, labial cultures growing E. coli, strep anginosis.  Plan to continue IV antibiotics tonight and needs monitoring for ongoing fevers, discharge, anticipate more than 2 midnight hospital stay with ongoing interventions, recommend continue inpatient status      The severity of illness, intensity of service provided, expected LOS and risk for adverse outcome make the care complex, high risk and appropriate for hospital admission.The patient requires hospital based medical care which is anticipated to require a stay of 2 or more midnights.        The information on this document is developed by the utilization review team in order for the business office to ensure compliance.  This only denotes the appropriateness of proper admission status and does not reflect the quality of care rendered.              Sincerely,       Eren Quigley MD  Physician Advisor  Utilization  Review[facility]    Phone: [phone]

## 2022-01-28 NOTE — PLAN OF CARE
"  VS: /76 (BP Location: Left arm, Patient Position: Semi-Cintron's)   Pulse 88   Temp 99.9  F (37.7  C) (Oral)   Resp 16   Ht 1.727 m (5' 8\")   Wt 137.1 kg (302 lb 3.2 oz)   LMP 01/27/2022 (Exact Date)   SpO2 98%   BMI 45.95 kg/m       O2: Stable on room air   Output: Voids spontaneously in bathroom.    Last BM: 1/28 per pt report   Activity: independent   Skin: L labial lesion (no drainage) and swelling.    Pain: denies   CMS: Intact. Denies numbness or tingling   Dressing: N/a   Diet: regular   LDA: R PIV saline locked   Equipment: IV pole, personal belongings   Plan: IV zosyn q6hr. Potential discharge tomorrow. Continue plan of care.        " Acute appendicitis with localized peritonitis, without perforation, abscess, or gangrene  02/23/2019    Active  So Conteh

## 2022-01-29 VITALS
RESPIRATION RATE: 18 BRPM | DIASTOLIC BLOOD PRESSURE: 73 MMHG | WEIGHT: 293 LBS | HEIGHT: 68 IN | BODY MASS INDEX: 44.41 KG/M2 | TEMPERATURE: 99.1 F | OXYGEN SATURATION: 96 % | HEART RATE: 97 BPM | SYSTOLIC BLOOD PRESSURE: 137 MMHG

## 2022-01-29 LAB
ANION GAP SERPL CALCULATED.3IONS-SCNC: 3 MMOL/L (ref 3–14)
BUN SERPL-MCNC: 6 MG/DL (ref 7–30)
CALCIUM SERPL-MCNC: 8.3 MG/DL (ref 8.5–10.1)
CHLORIDE BLD-SCNC: 111 MMOL/L (ref 94–109)
CO2 SERPL-SCNC: 27 MMOL/L (ref 20–32)
CREAT SERPL-MCNC: 0.77 MG/DL (ref 0.52–1.04)
CRP SERPL-MCNC: 91 MG/L (ref 0–8)
ERYTHROCYTE [DISTWIDTH] IN BLOOD BY AUTOMATED COUNT: 12.5 % (ref 10–15)
GFR SERPL CREATININE-BSD FRML MDRD: >90 ML/MIN/1.73M2
GLUCOSE BLD-MCNC: 104 MG/DL (ref 70–99)
HCT VFR BLD AUTO: 32.5 % (ref 35–47)
HGB BLD-MCNC: 10.9 G/DL (ref 11.7–15.7)
MCH RBC QN AUTO: 29.5 PG (ref 26.5–33)
MCHC RBC AUTO-ENTMCNC: 33.5 G/DL (ref 31.5–36.5)
MCV RBC AUTO: 88 FL (ref 78–100)
PLATELET # BLD AUTO: 236 10E3/UL (ref 150–450)
POTASSIUM BLD-SCNC: 3.5 MMOL/L (ref 3.4–5.3)
RBC # BLD AUTO: 3.69 10E6/UL (ref 3.8–5.2)
SODIUM SERPL-SCNC: 141 MMOL/L (ref 133–144)
WBC # BLD AUTO: 14.9 10E3/UL (ref 4–11)

## 2022-01-29 PROCEDURE — 250N000011 HC RX IP 250 OP 636: Performed by: EMERGENCY MEDICINE

## 2022-01-29 PROCEDURE — 80048 BASIC METABOLIC PNL TOTAL CA: CPT | Performed by: INTERNAL MEDICINE

## 2022-01-29 PROCEDURE — 99239 HOSP IP/OBS DSCHRG MGMT >30: CPT | Performed by: INTERNAL MEDICINE

## 2022-01-29 PROCEDURE — 36415 COLL VENOUS BLD VENIPUNCTURE: CPT | Performed by: INTERNAL MEDICINE

## 2022-01-29 PROCEDURE — 250N000011 HC RX IP 250 OP 636: Performed by: PHYSICIAN ASSISTANT

## 2022-01-29 PROCEDURE — 85027 COMPLETE CBC AUTOMATED: CPT | Performed by: INTERNAL MEDICINE

## 2022-01-29 PROCEDURE — 86140 C-REACTIVE PROTEIN: CPT | Performed by: INTERNAL MEDICINE

## 2022-01-29 RX ADMIN — PIPERACILLIN AND TAZOBACTAM 3.38 G: 3; .375 INJECTION, POWDER, LYOPHILIZED, FOR SOLUTION INTRAVENOUS at 08:39

## 2022-01-29 RX ADMIN — PIPERACILLIN AND TAZOBACTAM 3.38 G: 3; .375 INJECTION, POWDER, LYOPHILIZED, FOR SOLUTION INTRAVENOUS at 03:05

## 2022-01-29 RX ADMIN — ENOXAPARIN SODIUM 40 MG: 40 INJECTION SUBCUTANEOUS at 03:05

## 2022-01-29 ASSESSMENT — ACTIVITIES OF DAILY LIVING (ADL)
ADLS_ACUITY_SCORE: 4

## 2022-01-29 NOTE — PLAN OF CARE
"  VS: /50 (BP Location: Left arm)   Pulse 111   Temp 99.2  F (37.3  C) (Oral)   Resp 16   Ht 1.727 m (5' 8\")   Wt 137.1 kg (302 lb 3.2 oz)   LMP 01/27/2022 (Exact Date)   SpO2 98%   BMI 45.95 kg/m     O2: Stable on room air   Output: Voids independently in toilet   Last BM: 1/28/22   Activity: Up independently in room    Up for meals? Yes   Skin: Intact   Pain: Denies   CMS: Intact   Dressing: None   Diet: Regular, tolerated well   LDA: PIV right wrist   Equipment: IV pole   Plan: Discharge tomorrow AM   Additional Info: Swelling to left labia, firm to palpation, swelling spreads to mons pubis, warm to touch.  Pt states that ice is helpful.  Pt is on her period as well, hard to assess drainage from abscess on left labia.     "

## 2022-01-29 NOTE — PROGRESS NOTES
"  VS: /73 (BP Location: Right arm)   Pulse 97   Temp 99.1  F (37.3  C) (Oral)   Resp 18   Ht 1.727 m (5' 8\")   Wt 137.1 kg (302 lb 3.2 oz)   LMP 01/27/2022 (Exact Date)   SpO2 96%   BMI 45.95 kg/m     O2: Room air saturations 96%.    Output: Up to bathroom .Denies issues with voiding   Last BM:    Activity: Up ad adam   Skin: Labial abcess. Still hard to touch. Patient states\" I feel like it is getting so much better.\" Pt has been applying frequent ice packs to sofia area to relieve discomfort.    Pain: Ice packs to sofia area for discomfort.    CMS: Intact   Dressing: NA   Diet: Regular.    LDA: IV SL for antibiotics.    Equipment: Isolation for Covid.    Plan: Dr Magana thought patient might be able to go home today after looking over blood cultures and making plan of cares for antibiotics.    Additional Info: Pt is alert x 3 and able to direct cares easily. Very pleasant.        "

## 2022-01-29 NOTE — PLAN OF CARE
"  VS: /70 (BP Location: Left arm, Patient Position: Supine)   Pulse 98   Temp 99.8  F (37.7  C) (Oral)   Resp 16   Ht 1.727 m (5' 8\")   Wt 137.1 kg (302 lb 3.2 oz)   LMP 01/27/2022 (Exact Date)   SpO2 96%   BMI 45.95 kg/m     O2: Room air. Denies SOB or Chest pain.    Output: Independent, voids spontaneously.    Last BM: 1/28/22 per pt report. Continent, independent    Activity: Independent   Skin: Small bruises on abdomen from Lovenox.  Left Labia is swollen and hard to the touch    Pain: Slight discomfort when walking around do to swollen labia but no actual pain. Per pt report.    CMS: Intact, denies n/v, n/t, dizziness or abdominal pain.    Dressing: None.    Diet: Regular diet, takes pills whole.    LDA: R PIV S.L. Patent, infused piperacillin-tazobactam with no issue this shift.    Equipment: Personal belongings, IV pole.    Plan: discharge 1/29/22.  Continue POC. Pt able to make needs known.    Additional Info: Temp was 99.8 over night and pt requested Tylenol. Sticky note for MD was placed.        "

## 2022-01-29 NOTE — PROGRESS NOTES
To Whom it May Concern,     Ms. Renata Tobar was hospitalized 1/27/2022-1/29/2022.   Her 10 day COVID-19 isolation will be up February 6, 2022   She may Return to work per employer's policy.    An Magana MD

## 2022-01-29 NOTE — DISCHARGE SUMMARY
[facility]  Hospitalist Discharge Summary      Date of Admission:  1/27/2022  Date of Discharge:  1/29/2022  Discharging Provider: An Magana MD  Discharge Service: Hospitalist Service, GOLD TEAM 17    Discharge Diagnoses   Labial abscess, cellulitis, sepsis   COVID-19 infection   Obesity     Follow-ups Needed After Discharge   Follow-up Appointments     Adult [facility] Follow-up and recommended labs and tests      Follow up with primary care provider, Physician No Ref-Primary, follow up    culture results     Follow-up  with GYN in 1 week or sooner if symptoms worsen, call   [phone] for appointment, need to follow up  on culture results     Appointments on [place] and/or [facility] (with [facility] or [facility]   provider or service). Call [phone] if you haven't heard regarding   these appointments within 7 days of discharge.             Unresulted Labs Ordered in the Past 30 Days of this Admission     Date and Time Order Name Status Description    1/27/2022  6:09 AM Anaerobic Bacterial Culture Routine Preliminary     1/27/2022  6:09 AM Wound Aerobic Bacterial Culture Routine Preliminary     1/27/2022  3:49 AM Blood Culture Peripheral Blood Preliminary     1/27/2022  3:49 AM Blood Culture Line, venous Preliminary           Discharge Disposition   Discharged to home  Condition at discharge: Stable      Hospital Course      36 year old female presented to ER with fever and labial pain , lesion that started about 4 days prior to presentation . She had fever abdominal chills, no respiratory or GI symptoms     Labial abscess , cellulitis  evaluated by Gyn prior to admission, self draining. Culture as above. Suspect this is the cause for her  Fever and  leukocytosis. Erythema resolved but still has swelling . Culture growing 4 + streptococcus anginosus and 1+ e coli.  She was started on zosyn and vancomycin  And vancomycin  Was stopped 1/28 . CRP improved, culture  sensitivities not back yet .  WBC much improved. Plan was to switch to augmentin once doing well. Since she improved on Zosyn will continue with Augmentin as out patient to cover strep anginosus. Will need to follow up  On cultures to make sure that e coli is sensitive. Patient  Wants  to go home and final culture will not be back till tomorrow. Advised to return to ER if symptoms worsen. Will need GYN follow up  In 1 week      COVID 19 diagnosed 1/27, Continues to appear asymptomatic, return to work per employer's  policy if remains asymptomatic and afebrile     Has received 2 vaccines, most recent 11/29/21       Hypokalemia  Replaced     Consultations This Hospital Stay   PHARMACY TO DOSE VANCO    Code Status   Full Code    Time Spent on this Encounter   I, An Magana MD, personally saw the patient today and spent greater than 30 minutes discharging this patient.       An Magana MD  [facility] MED SURG ORTHOPEDIC  [address] [facility] [address]  Phone: [phone]  Fax: [phone]  ______________________________________________________________________    Physical Exam   Vital Signs: Temp: 99.1  F (37.3  C) Temp src: Oral BP: 137/73 Pulse: 97   Resp: 18 SpO2: 96 % O2 Device: None (Room air)    Weight: 302 lbs 3.2 oz   General appearence: awake alert  no apparent distress    HEENT: EOMI, PEARLA, sclera nonicteric,  moist mucus membranes,   NECK : supple  RESPIRATORY: non-labored breathing    CARDIOVASCULAR:S1 S2 regular   GASTROINTESTINAL:soft, non-distended t   SKIN: warm and dry, no mottling noted   NEUROLOGIC; awake alert and oriented, no focal deficits found  EXTREMITIES: no clubbing, cyanosis or edema , moves all extremity, good pedal pulses   MUSCULOSKELETAL: without deformity   Left labia without erythema but has swelling, not tender          Primary Care Physician   Physician No Ref-Primary    Discharge Orders      Reason for your hospital stay    Labial abscess  , COVID +     Activity    Your activity upon discharge: complete isolation as recommended otherwise normal activity     Adult [facility] Follow-up and recommended labs and tests    Follow up with primary care provider, Physician No Ref-Primary,    Fy with GYN in 1 week or sooner if symptoms worsen, call [phone] for appointment    Appointments on [place] and/or [facility] (with [facility] or [facility] provider or service). Call [phone] if you haven't heard regarding these appointments within 7 days of discharge.     Diet    Follow this diet upon discharge: regular       Significant Results and Procedures   Most Recent 3 CBC's:Recent Labs   Lab Test 01/29/22  0645 01/28/22  0822 01/27/22  0221   WBC 14.9* 16.2* 30.4*   HGB 10.9* 11.6* 13.1   MCV 88 88 89    243 264     Most Recent 3 BMP's:Recent Labs   Lab Test 01/29/22  0645 01/28/22  1534 01/28/22  0822 01/27/22  0221     --  140 137   POTASSIUM 3.5 3.6 3.3* 3.1*   CHLORIDE 111*  --  110* 102   CO2 27  --  25 23   BUN 6*  --  7 10   CR 0.77  --  0.74 0.71   ANIONGAP 3  --  5 12   JACKIE 8.3*  --  8.4* 9.0   *  --  100* 106*     Most Recent 2 LFT's:Recent Labs   Lab Test 01/27/22  1354   AST 12   ALT 21   ALKPHOS 89   BILITOTAL 1.2     Most Recent Urinalysis:Recent Labs   Lab Test 01/27/22  0336   COLOR Yellow   APPEARANCE Clear   URINEGLC Negative   URINEBILI Negative   URINEKETONE >150*   SG 1.019   UBLD Large*   URINEPH 5.5   PROTEIN 50 *   NITRITE Negative   LEUKEST Negative   RBCU 6*   WBCU 3   ,   Culture 4+ Streptococcus anginosus Abnormal     This organism is susceptible to ampicillin, penicillin, vancomycin and the cephalosporins. If treatment is required and your patient is allergic to penicillin, contact the microbiology lab within 5 days to request susceptibility testing.   1+ Escherichia coli Abnormal     Preliminary result, confirmation pending          Results for orders placed or performed during the hospital encounter of  01/27/22   Chest XR,  PA & LAT    Narrative    EXAM: CHEST 2 VIEWS  LOCATION: [facility]  DATE/TIME: 1/27/2022 5:15 AM    INDICATION: Fever. Tachycardia.  COMPARISON: None.    FINDINGS: The lungs are clear. Normal size cardiac silhouette.      Impression    IMPRESSION: No evidence of active cardiopulmonary disease.                      Discharge Medications   Current Discharge Medication List      START taking these medications    Details   amoxicillin-clavulanate (AUGMENTIN) 875-125 MG tablet Take 1 tablet by mouth 2 times daily  Qty: 5 tablet, Refills: 0    Associated Diagnoses: Vulvar cellulitis           Allergies   No Known Allergies

## 2022-01-29 NOTE — DISCHARGE INSTRUCTIONS
"Return to ER if symptoms worsen, monitor vulvar area for increased redness or swelling  call [phone] for GYN follow up  In 1 week  use warm compresses over labia 3 x a day     Discharge Instructions for COVID-19 Patients  You have--or may have--COVID-19. Please follow the instructions listed below.   If you have a weakened immune system, discuss with your doctor any other actions you need to take.  How can I protect others?  If you have symptoms (fever, cough, body aches or trouble breathing):    Stay home and away from others (self-isolate) until:  ? Your other symptoms have resolved (gotten better). And   ? You've had no fever--and no medicine that reduces fever--for 1 full day (24 hours). And   ? At least 10 days have passed since your symptoms started. (You may need to wait 20 days. Follow the advice of your care team.)  If you don't show symptoms, but testing showed that you have COVID-19:    Stay home and away from others (self-isolate) until at least 10 days have passed since the date of your first positive COVID-19 test.  During this time    Stay in your own room, even for meals. Use your own bathroom if you can.    Stay away from others in your home. No hugging, kissing or shaking hands. No visitors.    Don't go to work, school or anywhere else.    Clean \"high touch\" surfaces often (doorknobs, counters, handles). Use household cleaning spray or wipes.    You'll find a full list of  on the EPA website: www.epa.gov/pesticide-registration/list-n-disinfectants-use-against-sars-cov-2.    Cover your mouth and nose with a mask or other face covering to avoid spreading germs.    Wash your hands and face often. Use soap and water.    Caregivers in these groups are at risk for severe illness due to COVID-19:  ? People 65 years and older  ? People who live in a nursing home or long-term care facility  ? People with chronic disease (lung, heart, cancer, diabetes, kidney, liver, immunologic)  ? People who " have a weakened immune system, including those who:    Are in cancer treatment    Take medicine that weakens the immune system, such as corticosteroids    Had a bone marrow or organ transplant    Have an immune deficiency    Have poorly controlled HIV or AIDS    Are obese (body mass index of 40 or higher)    Smoke regularly    Caregivers should wear gloves while washing dishes, handling laundry and cleaning bedrooms and bathrooms.    Use caution when washing and drying laundry: Don't shake dirty laundry and use the warmest water setting that you can.    For more tips on managing your health at home, go to www.cdc.gov/coronavirus/2019-ncov/downloads/10Things.pdf.  How can I take care of myself at home?  1. Get lots of rest. Drink extra fluids (unless a doctor has told you not to).  2. Take Tylenol (acetaminophen) for fever or pain. If you have liver or kidney problems, ask your family doctor if it's okay to take Tylenol.   Adults can take either:   ? 650 mg (two 325 mg pills) every 4 to 6 hours, or   ? 1,000 mg (two 500 mg pills) every 8 hours as needed.  ? Note: Don't take more than 3,000 mg in one day. Acetaminophen is found in many medicines (both prescribed and over-the-counter medicines). Read all labels to be sure you don't take too much.   For children, check the Tylenol bottle for the right dose. The dose is based on the child's age or weight.  3. If you have other health problems (like cancer, heart failure, an organ transplant or severe kidney disease): Call your specialty clinic if you don't feel better in the next 2 days.  4. Know when to call 911. Emergency warning signs include:  ? Trouble breathing or shortness of breath  ? Pain or pressure in the chest that doesn't go away  ? Feeling confused like you haven't felt before, or not being able to wake up  ? Bluish-colored lips or face  5. Your doctor may have prescribed a blood thinner medicine. Follow their instructions.  Where can I get more  information?    [facility] - About COVID-19:   [email or web address]    CDC - What to Do If You're Sick: www.cdc.gov/coronavirus/2019-ncov/about/steps-when-sick.html    CDC - Ending Home Isolation: www.cdc.gov/coronavirus/2019-ncov/hcp/disposition-in-home-patients.html    CDC - Caring for Someone: www.cdc.gov/coronavirus/2019-ncov/if-you-are-sick/care-for-someone.html    [facility] - Interim Guidance for Hospital Discharge to Home: www.health.[facility].[place]./diseases/coronavirus/hcp/hospdischarge.pdf    Below are the COVID-19 hotlines at the [place] [employer] ([facility]). Interpreters are available.  ? For health questions: Call 655-787-6176 or 1-966.420.6873 (7 a.m. to 7 p.m.)  ? For questions about schools and childcare: Call 993-576-6021 or 1-599.796.8807 (7 a.m. to 7 p.m.)    For informational purposes only. Not to replace the advice of your health care provider. Clinically reviewed by Dr. Jose Gloria.   Copyright   2020 [facility]. All rights reserved. [facility] [id number] - REV 01/05/21.    Your 10 day isolation  Will be up Feb 6

## 2022-01-30 LAB
BACTERIA WND CULT: ABNORMAL
BACTERIA WND CULT: ABNORMAL

## 2022-02-01 LAB
BACTERIA BLD CULT: NO GROWTH
BACTERIA BLD CULT: NO GROWTH

## 2022-02-03 LAB
BACTERIA TISS BX CULT: ABNORMAL
BACTERIA TISS BX CULT: ABNORMAL

## 2022-02-06 ENCOUNTER — HEALTH MAINTENANCE LETTER (OUTPATIENT)
Age: 37
End: 2022-02-06

## 2022-10-03 ENCOUNTER — HEALTH MAINTENANCE LETTER (OUTPATIENT)
Age: 37
End: 2022-10-03

## 2023-02-12 ENCOUNTER — HEALTH MAINTENANCE LETTER (OUTPATIENT)
Age: 38
End: 2023-02-12

## 2024-03-10 ENCOUNTER — HEALTH MAINTENANCE LETTER (OUTPATIENT)
Age: 39
End: 2024-03-10

## 2025-03-16 ENCOUNTER — HEALTH MAINTENANCE LETTER (OUTPATIENT)
Age: 40
End: 2025-03-16